# Patient Record
(demographics unavailable — no encounter records)

---

## 2025-02-04 NOTE — REASON FOR VISIT
[CV Risk Factors and Non-Cardiac Disease] : CV risk factors and non-cardiac disease [Hyperlipidemia] : hyperlipidemia [Follow-Up - Clinic] : a clinic follow-up of [Dyspnea] : dyspnea [Hypertension] : hypertension [FreeTextEntry3] : Dr. Alexander [FreeTextEntry1] : murmur, CARLIN

## 2025-02-04 NOTE — PHYSICAL EXAM
[Well Developed] : well developed [Well Nourished] : well nourished [No Acute Distress] : no acute distress [Normal Venous Pressure] : normal venous pressure [No Carotid Bruit] : no carotid bruit [Normal S1, S2] : normal S1, S2 [No Rub] : no rub [Clear Lung Fields] : clear lung fields [Good Air Entry] : good air entry [No Respiratory Distress] : no respiratory distress  [Soft] : abdomen soft [Non Tender] : non-tender [No Masses/organomegaly] : no masses/organomegaly [Normal Bowel Sounds] : normal bowel sounds [Normal Gait] : normal gait [No Edema] : no edema [No Cyanosis] : no cyanosis [No Clubbing] : no clubbing [No Varicosities] : no varicosities [No Rash] : no rash [No Skin Lesions] : no skin lesions [Moves all extremities] : moves all extremities [No Focal Deficits] : no focal deficits [Normal Speech] : normal speech [Alert and Oriented] : alert and oriented [Normal memory] : normal memory [General Appearance - Well Developed] : well developed [Normal Appearance] : normal appearance [Well Groomed] : well groomed [General Appearance - Well Nourished] : well nourished [No Deformities] : no deformities [General Appearance - In No Acute Distress] : no acute distress [Normal Conjunctiva] : the conjunctiva exhibited no abnormalities [Normal Oral Mucosa] : normal oral mucosa [Normal Oropharynx] : normal oropharynx [Normal Jugular Venous A Waves Present] : normal jugular venous A waves present [Normal Jugular Venous V Waves Present] : normal jugular venous V waves present [No Jugular Venous Moore A Waves] : no jugular venous moore A waves [] : no respiratory distress [Respiration, Rhythm And Depth] : normal respiratory rhythm and effort [Exaggerated Use Of Accessory Muscles For Inspiration] : no accessory muscle use [Auscultation Breath Sounds / Voice Sounds] : lungs were clear to auscultation bilaterally [Bowel Sounds] : normal bowel sounds [Abdomen Soft] : soft [Abnormal Walk] : normal gait [Gait - Sufficient For Exercise Testing] : the gait was sufficient for exercise testing [Nail Clubbing] : no clubbing of the fingernails [Cyanosis, Localized] : no localized cyanosis [Skin Color & Pigmentation] : normal skin color and pigmentation [Skin Turgor] : normal skin turgor [Oriented To Time, Place, And Person] : oriented to person, place, and time [Affect] : the affect was normal [Mood] : the mood was normal [No Anxiety] : not feeling anxious [5th Left ICS - MCL] : palpated at the 5th LICS in the midclavicular line [Normal] : normal [No Precordial Heave] : no precordial heave was noted [Normal Rate] : normal [Rhythm Regular] : regular [Normal S1] : normal S1 [Normal S2] : normal S2 [No Gallop] : no gallop heard [I] : a grade 1 [2+] : left 2+ [No Abnormalities] : the abdominal aorta was not enlarged and no bruit was heard [No Pitting Edema] : no pitting edema present [Apical Thrill] : no thrill palpable at the apex [S3] : no S3 [S4] : no S4 [Click] : no click [Pericardial Rub] : no pericardial rub [Right Carotid Bruit] : no bruit heard over the right carotid [Left Carotid Bruit] : no bruit heard over the left carotid [Right Femoral Bruit] : no bruit heard over the right femoral artery [Left Femoral Bruit] : no bruit heard over the left femoral artery

## 2025-02-04 NOTE — DISCUSSION/SUMMARY
[FreeTextEntry1] : This is a 52-year-old male with past medical history significant for hypertension, gout, dyspepsia left shoulder surgery in 2013, status post vasectomy 2017, status post COVID-19 infection December 2022, who comes in for cardiac follow-up evaluation.  He denies chest pain, shortness of breath, PND, dizziness or syncope.He does not drink excessive caffeine or alcohol. His mother has Sjogren's disease.  He is not a smoker. Cardiac risk factors include hypertension, and hyperlipidemia. The patient had a normal exercise stress test February 4, 2025. His blood pressure is under good control on telmisartan 80 mg daily. Lipid panel done August 6, 2024 demonstrated cholesterol 201, HDL 43, triglycerides 200, LDL calculated 123, non-HDL cholesterol 158, LDL direct 128 mg/dL with hemoglobin A1c of 6.0.  The patient had normal exercise stress test February 6, 2024: His initial blood pressure was elevated 130/90 but he just took his medication prior to coming to the office. He will have new blood work done today for lipid profile and electrolytes. Blood work done January 18, 2023 demonstrated LDL direct of 137, total cholesterol 207, HDL 39, triglycerides 262, non-HDL cholesterol 168 mg/dL. Coronary calcium score done September 19, 2023 was 0. Echo Doppler examination done January 17, 2023 demonstrated minimal aortic valve regurgitation with physiologic mitral and tricuspid valve regurgitation and normal ejection fraction of 65%. PMH: The patient was complaining of epigastric discomfort not associated with any particular activity.  He then had transient chest wall pain which lasted for few weeks and was not associate with any particular activity. The patient is concerned about cardiovascular disease.  I have recommended he schedule coronary artery calcium score.    His 10-year ASCVD risk score is approximately 5.0.  If the patient does have evidence of coronary artery calcification, I would recommend aggressive lipid-lowering therapy. He will continue his current dose of telmisartan 80 mg daily. Lipid panel done January 17, 2023 demonstrated cholesterol 207, HDL 39, triglycerides 262, LDL direct 137, non-HDL cholesterol 168 mg/dL.  Hemoglobin A1c is 6.3. The patient understands he is a prediabetic and needs to improve his diet, and maintain good aerobic activity.  Echo Doppler examination done January 17, 2023 demonstrated physiologic mitral and tricuspid valve regurgitation with minimal aortic valve regurgitation, normal left ventricular ejection fraction 65%. The patient will follow-up with me after his coronary artery calcium score is completed. He has been stable on Micardis 80 mg daily.  He is currently taking allopurinol for his gout The patient had normal exercise stress test January 17, 2023. Blood work done July 8, 2022 demonstrated cholesterol 230, HDL of 45, triglycerides 316, LDL calculated 122 mg/dL, LDL direct 141 mg/dL and non-HDL cholesterol 185 mg/dL. Lifestyle modification including salt restriction, caloric restriction, was reinforced. He will have new blood work done for lipid panel, SMA-20. Electrocardiogram done December 3, 2021 demonstrated normal sinus rhythm rate 68 bpm is otherwise unremarkable. The patient had a normal exercise stress test February 5, 2021.  Echo Doppler examination done February 5, 2021 demonstrated mild mitral valve regurgitation mild tricuspid valve regurgitation, mild aortic valve regurgitation, minimal pulmonic valve regurgitation with satisfactory left ventricular function estimated ejection fraction 66%.  Electrocardiogram done January 22, 2021 demonstrated normal sinus rhythm rate of 88 bpm is otherwise unremarkable  The patient understands that if his lipid profile does not improve, he will require lipid-lowering therapy to reduce his cardiovascular risk.  The patient understands that aerobic exercises must be increased to 40 minutes 4 times per week. A detailed discussion of lifestyle modification was done today. The patient has a good understanding of the diagnosis, and treatment plan. Lifestyle modification was also outlined.

## 2025-02-04 NOTE — ASSESSMENT
[FreeTextEntry1] : Prior note nurse practitioner Ignacio August 6, 2024::  This is a 52-year-old male here today for follow-up cardiac evaluation.   He has a past medical history significant for gout, hypertension, hypercholesterolemia, s/p left shoulder surgery in 2013, s/p vasectomy 2017.  Cardiac risk factors include HTN, HLD.   HPI: He is feeling generally well today and denies chest pain, dizziness, heart palpitations, recent episodes of syncope or falls. He does have some dyspnea on exertion when climbing stairs.   Current Medications: Telmisartan 80 mg daily   *Recently lost his mother who was also a patient here to a thrombotic stroke this summer, which doctors said was caused by atrial fibrillation.   *He works as a  for the [employer]   BLOOD PRESSURE: -BP is well controlled in today's visit.   BLOOD WORK: -New blood work was done 08/06/2024 to evaluate lipid profile, CBC, BMP, hepatic function, A1C and TSH. -Lipid panel done February 2024 demonstrated triglycerides 187, cholesterol 212, HDL 45, , Non-, LDL direct 153, hemoglobin A1c 6%.  -Lipid panel done January 17, 2023 demonstrated cholesterol 207, HDL 39, triglycerides 262, LDL direct 137, non-HDL cholesterol 168 mg/dL.  Hemoglobin A1c is 6.3. -Blood work done July 8, 2022 demonstrated cholesterol 230, HDL of 45, triglycerides 316, LDL calculated 122 mg/dL, LDL direct 141 mg/dL and non-HDL cholesterol 185 mg/dL.    TESTING/REPORTS: -EKG done 08/06/2024 demonstrated regular sinus rhythm rate 77 bpm otherwise unremarkable.   -Echocardiogram done February 2024 demonstrated normal left ventricular systolic function EF 65%, minimal to mild mitral regurgitation, minimal tricuspid regurgitation, mild aortic regurgitation.   -Coronary calcium score done September 19, 2023 was 0.  -Echo Doppler examination done January 17, 2023 demonstrated minimal aortic valve regurgitation with physiologic mitral and tricuspid valve regurgitation and normal ejection fraction of 65%.  -EKG done today 07/08/2022 which demonstrated regular sinus rhythm with nonspecific ST-T wave changes BPM of 94.  -EKG done Jun 09, 2021 which demonstrated regular sinus rhythm with nonspecific ST-T wave changes, BPM of 60  -The patient had an echocardiogram done on 2/5/2021 demonstrated mild mitral, tricuspid, aortic and pulmonic regurgitation with normal left ventricular systolic function.  -EF on echo reported to be 66%.  -The patient had a normal exercise stress test done on 2/5/2021   PLAN: -He will continue with his usual medications and will contact the office if he is having any complaints between now and their next follow up appointment. -He will schedule an exercise stress test to evaluate for significant coronary artery disease -Patient will schedule echocardiogram doppler examination to evaluate murmur, left ventricular function, chamber size, and rule out hypertrophy.   I have discussed the plan of care with JOE FRYE and he will follow up in 6 months. They are compliant with all of their medications.     The patient understands that aerobic exercises must be increased to 40 minutes 4 times/week and a detailed discussion of lifestyle modification was done today.   The patient has a good understanding of the diagnosis, treatment plan and lifestyle modification.   They will contact me at the office for any questions with their care or any changes in their health status.   The patient was discussed with supervision physician Dr. Vel Royal at the time of the visit and the plan of care will be carried out as noted above.     KAROL Pierre NP